# Patient Record
Sex: MALE | Race: WHITE | NOT HISPANIC OR LATINO
[De-identification: names, ages, dates, MRNs, and addresses within clinical notes are randomized per-mention and may not be internally consistent; named-entity substitution may affect disease eponyms.]

---

## 2019-04-10 PROBLEM — Z00.00 ENCOUNTER FOR PREVENTIVE HEALTH EXAMINATION: Status: ACTIVE | Noted: 2019-04-10

## 2019-04-17 ENCOUNTER — APPOINTMENT (OUTPATIENT)
Dept: VASCULAR SURGERY | Facility: CLINIC | Age: 79
End: 2019-04-17
Payer: COMMERCIAL

## 2019-04-17 VITALS — OXYGEN SATURATION: 98 % | SYSTOLIC BLOOD PRESSURE: 138 MMHG | HEART RATE: 56 BPM | DIASTOLIC BLOOD PRESSURE: 70 MMHG

## 2019-04-17 DIAGNOSIS — Z86.39 PERSONAL HISTORY OF OTHER ENDOCRINE, NUTRITIONAL AND METABOLIC DISEASE: ICD-10-CM

## 2019-04-17 DIAGNOSIS — R73.03 PREDIABETES.: ICD-10-CM

## 2019-04-17 PROCEDURE — 99204 OFFICE O/P NEW MOD 45 MIN: CPT | Mod: 25

## 2019-04-17 PROCEDURE — 93970 EXTREMITY STUDY: CPT

## 2019-04-17 PROCEDURE — 29580 STRAPPING UNNA BOOT: CPT | Mod: LT

## 2019-04-24 ENCOUNTER — APPOINTMENT (OUTPATIENT)
Dept: VASCULAR SURGERY | Facility: CLINIC | Age: 79
End: 2019-04-24
Payer: COMMERCIAL

## 2019-04-24 DIAGNOSIS — L98.499 NON-PRESSURE CHRONIC ULCER OF SKIN OF OTHER SITES WITH UNSPECIFIED SEVERITY: ICD-10-CM

## 2019-04-24 PROCEDURE — 99214 OFFICE O/P EST MOD 30 MIN: CPT

## 2019-04-26 NOTE — ADDENDUM
[FreeTextEntry1] : This note was written by Jyothi Armendariz on 04/17/2019  acting as scribe for Dr. Zhao.\par

## 2019-04-26 NOTE — ASSESSMENT
[FreeTextEntry1] : 77 y/o M with history of venous insufficiency, b/l RFA GSV presents today with non-healing ulcer to lateral left ankle. Will wrap with UNNA boot and have him follow-up here next week.

## 2019-04-26 NOTE — HISTORY OF PRESENT ILLNESS
[FreeTextEntry1] : 79 y/o M with history of venous insufficiency and b/l RFA GSV who presents today for initial evaluation of non-healing ulcer on lateral left ankle. Patient presents with his wife who reports that the ulcer on the left ankle had closed at the end of January 2019, but a few days after flying back from a [place] trip in mid-February, the ulcer reopened. He saw a dermatologist at this time who recommended he apply an antibiotic ointment 3x daily and MediHoney every night, but the ulcer has not been healing. He reports that he was evlauated by a vascular specialist for this and was told that the "small veins' in his foot were not working well and the 'big veins" were fine. He was referred here for further evaluation  He wears compression stockings daily.\par \par Of note patient's wife says that he fell last week and is currently undergoing work-up for this. He denies any history of DVT. \par \par Patient's wife works for [employer] in  as a lactation specialist

## 2019-04-26 NOTE — CONSULT LETTER
[Dear  ___] : Dear  [unfilled], [FreeTextEntry2] : Kwame Ramírez MD\par [address], [address]\[place] [address]\par \par Alex Castro MD\par [address], [address]\[place] [address]\par \par Toño Castorena MD\par [address]\[place] [address] [FreeTextEntry3] : Sincerely, \par \par Davian Zhao M.D. \par , [facility]\par , Department of Surgery [facility]\par Professor of Surgery, [employer] at [facility]  [FreeTextEntry1] : I saw Mr. Glen Beyer for initial evaluation of known venous insufficiency. He reports history of bilateral ablation of greater saphenous veins in the past, and a recent left lateral ankle wound since earlier this year. The wife reports they have been placing an antibiotic ointment 3 times a day and MediHoney nightly however, the wound has not been healing. She reports wearing compression stockings everyday. Denies any rest pain or claudication symptoms. \par \par On exam, bilateral lower extremities are mildly to moderately swollen from thighs to ankles. Superficial left lateral ankle wound has no evidence of infection.\par \par Venous duplex demonstrated no evidence of deep or superficial vein thrombosis. No reflux appreciated. Bilateral great saphenous veins are absent.\par \par I recommended an Unna boot dressing for Mr Beyer.  I will see him again in one week.  \par \par My complete EMR office note is below for your records.

## 2019-04-26 NOTE — PROCEDURE
[FreeTextEntry1] : BLE doppler today shows no evidence of DVT, no reflux. GSV is absent bilatearlly. \par \par Left UNNA boot was applied in office today

## 2019-04-26 NOTE — END OF VISIT
[FreeTextEntry3] : All medical record entries made by the Scribe were at my, Dr. Zhao's, discretion and personally dictated by me on 04/17/2019. I have reviewed the chart and agree that the record accurately reflects my personal performance of the history, physical exam, assessment and plan. I have also personally directed, reviewed and agreed to the chart.

## 2019-04-26 NOTE — PHYSICAL EXAM
[Respiratory Effort] : normal respiratory effort [Alert] : alert [Calm] : calm [2+] : left 2+ [Ankle Swelling (On Exam)] : present [Ankle Swelling Bilaterally] : bilaterally  [] : bilaterally [Ankle Swelling On The Right] : mild [No Rash or Lesion] : No rash or lesion [JVD] : no jugular venous distention  [de-identified] : NCAT [de-identified] : Well appearing. NAD [Varicose Veins Of Lower Extremities] : not present [FreeTextEntry1] : small, superficial ulcer to lateral left ankle, clean, no surrounding erythema or edema [de-identified] : FROM

## 2019-05-02 NOTE — PHYSICAL EXAM
[Respiratory Effort] : normal respiratory effort [2+] : left 2+ [Ankle Swelling (On Exam)] : present [Ankle Swelling Bilaterally] : bilaterally  [] : bilaterally [No Rash or Lesion] : No rash or lesion [Ankle Swelling On The Right] : mild [Alert] : alert [Calm] : calm [JVD] : no jugular venous distention  [Varicose Veins Of Lower Extremities] : not present [de-identified] : Well appearing. NAD [de-identified] : NCAT [FreeTextEntry1] : small, superficial ulcer to lateral left ankle, clean, no surrounding erythema or edema, improved from previous visit, healed [de-identified] : FROM

## 2019-05-02 NOTE — HISTORY OF PRESENT ILLNESS
[FreeTextEntry1] : 79 y/o M with history of venous insufficiency and b/l RFA GSV who presents today for follow-up evaluation of ulcer on lateral left ankle. Patient was seen here last week for this and had an UNNA boot strapped. He reports no difficulties with the UNNA boot and returns today for reevaluation. He reports that the ulcer has improved significantly. No other medical complaints at this time. Walking without difficulty. No claudication or rest pain. \par \par He was admitted 5 days ago to [facility] for AMS, confusion, and audio and visual hallucinations where he was diagnosed with dementia and recommended to see a neurologist. Patient was discharged yesterday. His internist, Dr. Ramírez, is aware of this.

## 2019-05-02 NOTE — ADDENDUM
[FreeTextEntry1] : This note was written by Jyothi Armendariz on 04/24/2019  acting as scribe for Dr. Zhao.

## 2019-05-02 NOTE — ASSESSMENT
[FreeTextEntry1] : 79 y/o M with history of venous insufficiency, b/l RFA GSV presents today with ulcer to lateral left ankle. Patient had UNNA boot strapped last week, which significantly improved ulcer. Ulcer is healing well, no longer needs UNNA boot. His wound was cleaned with peroxide and his leg was wrapped with an ACE bandage in the office today. He will continue this wound care at home. He was encouraged to walk and to elevate the legs often. He will follow up here as needed.

## 2019-05-02 NOTE — END OF VISIT
[FreeTextEntry3] : All medical record entries made by the Scribe were at my, Dr. Zhao's, discretion and personally dictated by me on 04/24/2019. I have reviewed the chart and agree that the record accurately reflects my personal performance of the history, physical exam, assessment and plan. I have also personally directed, reviewed and agreed to the chart.

## 2019-09-13 ENCOUNTER — APPOINTMENT (OUTPATIENT)
Dept: VASCULAR SURGERY | Facility: CLINIC | Age: 79
End: 2019-09-13
Payer: COMMERCIAL

## 2019-09-13 PROCEDURE — 99213 OFFICE O/P EST LOW 20 MIN: CPT

## 2019-09-17 NOTE — HISTORY OF PRESENT ILLNESS
[FreeTextEntry1] : 79 y/o M with history of venous insufficiency and b/l RFA GSV who presents today for follow-up and concerns of a new wound on the right foot. Patient was seen here before and had UNNA boot strapped bilaterally for swelling and a L lateral foot venous ulcer. He reports that the ulcer on the left side is close to healed. There is a new area on the right foot that is concerning for a new wound. No other medical complaints at this time. He has been walking without difficulty. Denies any claudication or rest pain, fevers or chills. He has been wearing compression stockings daily as well which he reports controls the swelling significantly however, they are very difficult to put on. Also, the wife expresses they have been using mineral oil once a day and a lotion once a day.

## 2019-09-17 NOTE — ASSESSMENT
[FreeTextEntry1] : 77 y/o M with history of venous insufficiency, b/l RFA GSV, and hx of venous ulcers who now presents with concerns of a new ulcer on the right foot. On exam, pt has very mild nonpitting edema, no open sores/wounds but skin around the ankles and heels appears dry. Left lateral foot has healing scabs in place with no signs of infection. Right foot new area is red, superficial, not open, and dry c/w stasis dermatitis. He was encouraged to walk and to elevate the legs often.I have prescribed clobetazol to apply to the R stasis dermatitis area BID. Mineral oil to be placed at night before bed and moisturizer lotion in AM and mid-day to prevent new wounds from appearing. He will follow up here in 2 weeks to ensure area is not worsening.

## 2019-09-17 NOTE — PHYSICAL EXAM
[Respiratory Effort] : normal respiratory effort [2+] : left 2+ [Ankle Swelling (On Exam)] : present [Ankle Swelling Bilaterally] : bilaterally  [] : bilaterally [No Rash or Lesion] : No rash or lesion [Alert] : alert [Calm] : calm [JVD] : no jugular venous distention  [Varicose Veins Of Lower Extremities] : bilaterally [Ankle Swelling On The Right] : mild [Ankle Swelling On The Left] : moderate [Oriented to Person] : oriented to person [Oriented to Place] : oriented to place [de-identified] : Well appearing. NAD [de-identified] : NCAT [FreeTextEntry1] : Small, superficial, scabs to lateral left ankle, clean, no surrounding erythema or edema. Small red circular, superficial area on the dorsal aspect of the foot very dry with no surrounding erythema, open areas or drainage; area appears to be c/w stasis dermatitis. B/L LEs edema is controlled, non pitting and mild throughout both LEs.  [de-identified] : FROM

## 2019-09-25 ENCOUNTER — APPOINTMENT (OUTPATIENT)
Dept: VASCULAR SURGERY | Facility: CLINIC | Age: 79
End: 2019-09-25
Payer: COMMERCIAL

## 2019-09-25 DIAGNOSIS — I99.8 OTHER DISORDER OF CIRCULATORY SYSTEM: ICD-10-CM

## 2019-09-25 PROCEDURE — 99214 OFFICE O/P EST MOD 30 MIN: CPT

## 2019-09-25 RX ORDER — CLOBETASOL PROPIONATE 0.5 MG/G
0.05 OINTMENT TOPICAL DAILY
Qty: 1 | Refills: 3 | Status: ACTIVE | COMMUNITY
Start: 2019-09-25 | End: 1900-01-01

## 2019-10-07 NOTE — END OF VISIT
[FreeTextEntry3] : All medical record entries made by the Scribe were at my, Dr. Zhao's direction and personally dictated by me on 09/25/2019 I have reviewed the chart and agree that the record accurately reflects my personal performance of the history, physical exam, assessment and plan. I have also personally directed, reviewed, and agreed with the chart.\par

## 2019-10-07 NOTE — PHYSICAL EXAM
[Respiratory Effort] : normal respiratory effort [No Rash or Lesion] : No rash or lesion [Alert] : alert [Oriented to Person] : oriented to person [Oriented to Place] : oriented to place [Calm] : calm [JVD] : no jugular venous distention  [de-identified] : Well appearing. NAD [de-identified] : NCAT [FreeTextEntry1] : Small, superficial, scabs to lateral left ankle, clean, no surrounding erythema or edema. Small red circular, superficial area on the dorsal aspect of the foot very dry with no surrounding erythema, open areas or drainage; area appears to be c/w stasis dermatitis. B/L LEs edema is controlled, non pitting and mild throughout both LEs. \par Clusters of rash noted on R anterior knee and bilateral posterior forearm. [de-identified] : FROM [de-identified] : See cardiovascular

## 2019-10-07 NOTE — HISTORY OF PRESENT ILLNESS
[FreeTextEntry1] : 77 y/o M with history of venous insufficiency and PSHx of B/L GSV RFA, presents here today for follow-up wound care. Patient was seen here before and had UNNA boot strapped bilaterally for swelling and a L lateral foot venous ulcer. He reports that the ulcer on the left side is close to healed. New area over the right foot is also healing. Patient presents with his wife who states that she has been cleaning his wounds for him as well as applying mineral oil to moisturize his legs and clobetasol to the right foot. He has also been wearing compression stockings daily as well which he reports controls the swelling well. \par \par Additionally, patient's wife expresses concerns for a recent cluster of rashes that appeared on the patient's Right anterior knee as well as on his bilateral posterior arms.

## 2019-10-07 NOTE — ASSESSMENT
[FreeTextEntry1] : 79 y/o M with history of venous insufficiency, b/l RFA GSV, and hx of venous ulcers presenting for follow up wound care.  On exam, pt has very mild nonpitting edema, no open sores/wounds but skin around the ankles and heels appears dry. Left lateral foot has healing scabs in place with no signs of infection. Right foot wound is red, superficial, not open, and dry c/w stasis dermatitis, improved from previous visit. He is encouraged to walk daily and to elevate his legs often. Clobetasol prescribed for patient. Instructed him to apply to the R stasis dermatitis area BID. He is advised to keep his legs moisturized by applying mineral oil daily. He will follow up here PRN.

## 2019-10-07 NOTE — ADDENDUM
[FreeTextEntry1] : Documented by Juan Daniel Lee acting as a scribe for Dr. Davian Zhao on 09/25/2019\par

## 2020-01-16 ENCOUNTER — APPOINTMENT (OUTPATIENT)
Dept: VASCULAR SURGERY | Facility: CLINIC | Age: 80
End: 2020-01-16
Payer: COMMERCIAL

## 2020-01-16 DIAGNOSIS — I87.8 OTHER SPECIFIED DISORDERS OF VEINS: ICD-10-CM

## 2020-01-16 DIAGNOSIS — I87.2 VENOUS INSUFFICIENCY (CHRONIC) (PERIPHERAL): ICD-10-CM

## 2020-01-16 DIAGNOSIS — Z87.891 PERSONAL HISTORY OF NICOTINE DEPENDENCE: ICD-10-CM

## 2020-01-16 PROCEDURE — 99213 OFFICE O/P EST LOW 20 MIN: CPT

## 2020-01-17 PROBLEM — I87.2 STASIS DERMATITIS: Status: ACTIVE | Noted: 2019-09-13

## 2020-01-17 PROBLEM — Z87.891 FORMER SMOKER: Status: ACTIVE | Noted: 2019-04-17

## 2020-01-17 PROBLEM — I87.8 CHRONIC VENOUS STASIS: Status: ACTIVE | Noted: 2020-01-17

## 2020-01-17 PROBLEM — I87.2 CHRONIC VENOUS INSUFFICIENCY: Status: ACTIVE | Noted: 2020-01-17

## 2020-01-17 NOTE — ASSESSMENT
[FreeTextEntry1] : 78 y/o M with history of chronic venous insufficiency, chronic venous stasis, b/l RFA GSV, and hx of venous ulcers presenting for follow up.  On exam, pt has very mild nonpitting edema, no open sores/wounds but skin around the lateral ankles appears dry and with similar appearance to the right knee rash, possibly eczema. No signs of infection, no drainage. He is encouraged to walk daily and to elevate his legs often. Halobetasol prescribed for patient to use over rash/scabbed and irritated areas. Continue to moisturize daily with mineral oil and A&D. U*se compression stockings or ACE bandages daily. Advised to see a dermatologist given eczema appearing rash on the right knee with both foot areas similar and new arm rash. He will follow up here PRN.

## 2020-01-17 NOTE — HISTORY OF PRESENT ILLNESS
[FreeTextEntry1] : 78 y/o M with history of venous insufficiency and PSHx of B/L GSV RFA, presents here today for follow-up and concerns of a new venous ulcer. Patient was seen here before and had UNNA boot strapped bilaterally for swelling and a L lateral foot venous ulcer. The wife reports areas on both feet laterally that are irritated and appear to start becoming an ulcer. She has been cleaning his wounds for him as well as applying mineral oil to moisturize his legs. He has also been wearing compression stockings daily as well which he reports controls the swelling well. Previously he had been prescribed clobetasol but the wife states the PCP mentioned it is too strong, so they have been using hydrocortisone over the wounds and a right knee rash looking area. Denies any fevers or chills. \par \par Additionally, patient's wife expresses concerns for a recent cluster of rashes that appeared on the patient's bilateral arms.

## 2020-01-17 NOTE — PROCEDURE
[FreeTextEntry1] : A&D cream and mineral oil applied to both LEs. Legs wrapped with Kerlix and ACE bandage.

## 2020-01-17 NOTE — PHYSICAL EXAM
[facility]  Anesthesia Pre-Operative Evaluation         Patient Name: John Echeverria Jr.  YOB: 2021  MRN: [id number]    SUBJECTIVE:     Pre-operative evaluation for Procedure(s) (LRB):  MRI (Magnetic Resonance Imagine) (N/A)     2020    John Echeverria Jr. is a 3 wk.o. male w/ a significant PMHx of fever with tmax 103 transferred from [facility] for  sepsis.     PICC line placed on 5/15/20.    Patient now presents for the above procedure(s).      LDA:   PICC Double Lumen 05/15/20 1530 left brachial (Active)   Site Assessment No drainage;No redness;No swelling;No warmth 2020  2:00 PM   Line Securement Device Secured with sutures 2020  2:00 PM   Dressing Type No biopatch (patient < 2 mos) 2020  2:00 PM   Dressing Status Clean;Intact;Dry 2020  2:00 PM   Dressing Intervention Integrity maintained 2020  2:00 PM   Date on Dressing 20  7:30 AM   Dressing Due to be Changed 20  7:30 AM   Left Lumen Patency/Care Normal saline locked 2020  2:00 PM   Right Lumen Patency/Care Normal saline locked 2020  2:00 PM   Number of days: 2       Prev airway: 05/15/20; Placement Time: 1441; Inserted by: CRNA; Airway Device: LMA; Mask Ventilation: Easy; Airway Device Size: 1.0; Style: Cuffed; Cuff Inflation: Minimal occlusive pressure; Inflation Amount: 2; Placement Verified By: Auscultation, Capnometry, ETT Condensation; Complicating Factors: None; Intubation Findings: Positive EtCO2, Bilateral breath sounds, Atraumatic/Condition of teeth unchanged; Securment: Lips; Complications: None    Drips:    dextrose 5 % and 0.9 % NaCl 13 mL/hr at 20 1301       Patient Active Problem List   Diagnosis    Single liveborn infant    Infant of mother with gestational diabetes    Sepsis in  due to group B Streptococcus    Meningitis    GBS (group B streptococcus) infection       Review of  patient's allergies indicates:  No Known Allergies    Current Inpatient Medications:   penicillin g IV syringe (NICU)  112,500 Units/kg (Dosing Weight) Intravenous Q6H    PHENobarbitaL  4 mg/kg (Dosing Weight) Oral Daily       No current facility-administered medications on file prior to encounter.      No current outpatient medications on file prior to encounter.       History reviewed. No pertinent surgical history.    Social History     Socioeconomic History    Marital status: Single     Spouse name: Not on file    Number of children: Not on file    Years of education: Not on file    Highest education level: Not on file   Occupational History    Not on file   Social Needs    Financial resource strain: Not on file    Food insecurity:     Worry: Not on file     Inability: Not on file    Transportation needs:     Medical: Not on file     Non-medical: Not on file   Tobacco Use    Smoking status: Never Smoker    Smokeless tobacco: Never Used   Substance and Sexual Activity    Alcohol use: Never     Frequency: Never    Drug use: Never    Sexual activity: Never   Lifestyle    Physical activity:     Days per week: Not on file     Minutes per session: Not on file    Stress: Not on file   Relationships    Social connections:     Talks on phone: Not on file     Gets together: Not on file     Attends [employer] service: Not on file     Active member of club or organization: Not on file     Attends meetings of clubs or organizations: Not on file     Relationship status: Not on file   Other Topics Concern    Not on file   Social History Narrative    Not on file       OBJECTIVE:     Vital Signs Range (Last 24H):  Temp:  [36.1 °C (97 °F)-37.2 °C (98.9 °F)]   Pulse:  [150-167]   Resp:  [38-48]   BP: ()/(31-71)   SpO2:  [97 %-100 %]       Significant Labs:  Lab Results   Component Value Date    WBC 16.20 2020    HGB 8.1 (L) 2020    HCT 25.8 (L) 2020     (H) 2020    ALT 8 (L)  2020    AST 26 2020     2020    K 2020     2020    CREATININE 0.4 (L) 2020    BUN 5 2020    CO2020    INR 1.3 (H) 2020       Diagnostic Studies: No relevant studies.    EKG:   Results for orders placed or performed in visit on 20   EKG 12-lead    Collection Time: 20 11:15 AM    Narrative    Test Reason :     Vent. Rate : 224 BPM     Atrial Rate : 224 BPM     P-R Int : 084 ms          QRS Dur : 058 ms      QT Int : 204 ms       P-R-T Axes : 116 065 127 degrees     QTc Int : 394 ms         Pediatric ECG Analysis       Ectopic atrial tachycardia  Nonspecific ST and T wave abnormality  No previous ECGs available  Confirmed by Petr ALMAZAN, Clarice Mendes ([id number]) on 2020 7:10:31 AM    Referred By: [email or web address]   SELF           Electronically Signed By:Clarice Humphreys MD         2D ECHO:  No results found for this or any previous visit.      ASSESSMENT/PLAN:       Anesthesia Evaluation    I have reviewed the Patient Summary Reports.     I have reviewed the Medications.     Review of Systems  Anesthesia Hx:  Denies Hx of Anesthetic complications  Neg history of prior surgery. Denies Family Hx of Anesthesia complications.   Denies Personal Hx of Anesthesia complications.   Social:  No Alcohol Use, Non-Smoker    Hematology/Oncology:  Hematology Normal   Oncology Normal     EENT/Dental:EENT/Dental Normal   Cardiovascular:  Cardiovascular Normal     Pulmonary:  Pulmonary Normal    Renal/:  Renal/ Normal     Hepatic/GI:  Hepatic/GI Normal    Musculoskeletal:  Musculoskeletal Normal    Neurological:   History of meningitis   Endocrine:  Endocrine Normal    Dermatological:  Skin Normal    Psych:  Psychiatric Normal           Physical Exam  General:  Well nourished    Airway/Jaw/Neck:  Airway Findings: Mouth Opening: Normal Tongue: Normal  General Airway Assessment:       Dental:  Dental Findings: Edentulous    Chest/Lungs:  Chest/Lungs Findings: Clear to auscultation, Normal Respiratory Rate     Heart/Vascular:  Heart Findings: Rate: Normal  Rhythm: Regular Rhythm  Heart murmur: negative       Mental Status:  Mental Status Findings:  Cooperative, Alert and Oriented         Anesthesia Plan  Type of Anesthesia, risks & benefits discussed:  Anesthesia Type:  general  Patient's Preference:   Intra-op Monitoring Plan: standard ASA monitors  Intra-op Monitoring Plan Comments:   Post Op Pain Control Plan: multimodal analgesia  Post Op Pain Control Plan Comments:   Induction:   IV  Beta Blocker:  Patient is not currently on a Beta-Blocker (No further documentation required).       Informed Consent: Patient representative understands risks and agrees with Anesthesia plan.  Questions answered. Anesthesia consent signed with patient representative.  ASA Score: 3     Day of Surgery Review of History & Physical:    H&P update referred to the surgeon.         Ready For Surgery From Anesthesia Perspective.        [JVD] : no jugular venous distention  [Respiratory Effort] : normal respiratory effort [Ankle Swelling (On Exam)] : present [1+] : left 1+ [Ankle Swelling On The Right] : mild [Varicose Veins Of Lower Extremities] : present [Ankle Swelling On The Left] : moderate [] : present [Ankle Swelling Bilaterally] : severe [Purpura] : no purpura  [Petechiae] : no petechiae [Skin Ulcer] : no ulcer [Alert] : alert [Skin Induration] : no induration [Calm] : calm [Oriented to Person] : oriented to person [Oriented to Place] : oriented to place [FreeTextEntry1] : Clusters of rash noted on R anterior knee and bilateral forearms.\par Small, superficial, scabs to lateral right and left ankle (R>L), clean, no surrounding erythema or edema. Both areas with similar rash looking, circular pattern to the knee area. Skin raw on areas were scabs were present. No open areas or drainage. B/L LEs edema is controlled, non pitting and mild throughout both LEs. Skin overall very well moisturized. [de-identified] : NCAT [de-identified] : Well appearing. NAD [de-identified] : FROM [de-identified] : See cardiovascular

## 2020-07-08 RX ORDER — HALOBETASOL PROPIONATE 0.5 MG/G
0.05 OINTMENT TOPICAL DAILY
Qty: 3 | Refills: 0 | Status: ACTIVE | COMMUNITY
Start: 2020-01-17 | End: 1900-01-01